# Patient Record
Sex: FEMALE | Race: ASIAN | URBAN - METROPOLITAN AREA
[De-identification: names, ages, dates, MRNs, and addresses within clinical notes are randomized per-mention and may not be internally consistent; named-entity substitution may affect disease eponyms.]

---

## 2022-11-04 ENCOUNTER — HOSPITAL ENCOUNTER (EMERGENCY)
Dept: ULTRASOUND IMAGING | Age: 32
Discharge: HOME | End: 2022-11-07
Payer: MEDICAID

## 2022-11-04 ENCOUNTER — HOSPITAL ENCOUNTER (EMERGENCY)
Age: 32
Discharge: HOME | End: 2022-11-04
Attending: EMERGENCY MEDICINE
Payer: MEDICAID

## 2022-11-04 ENCOUNTER — HOSPITAL ENCOUNTER (EMERGENCY)
Dept: ULTRASOUND IMAGING | Age: 32
End: 2022-11-04
Payer: MEDICAID

## 2022-11-04 VITALS
HEART RATE: 92 BPM | RESPIRATION RATE: 16 BRPM | OXYGEN SATURATION: 98 % | TEMPERATURE: 98 F | BODY MASS INDEX: 23.53 KG/M2 | WEIGHT: 127.87 LBS | DIASTOLIC BLOOD PRESSURE: 58 MMHG | SYSTOLIC BLOOD PRESSURE: 105 MMHG | HEIGHT: 62 IN

## 2022-11-04 DIAGNOSIS — O20.9 BLEEDING IN EARLY PREGNANCY: Primary | ICD-10-CM

## 2022-11-04 DIAGNOSIS — R82.90 ABNORMAL URINE FINDINGS: ICD-10-CM

## 2022-11-04 LAB
ABO + RH BLD: NORMAL
ALBUMIN SERPL-MCNC: 3.5 G/DL (ref 3.5–5)
ALBUMIN/GLOB SERPL: 0.8 {RATIO} (ref 0.4–1.6)
ALP SERPL-CCNC: 69 U/L (ref 50–130)
ALT SERPL-CCNC: 24 U/L (ref 12–65)
ANION GAP SERPL CALC-SCNC: 8 MMOL/L (ref 2–11)
APPEARANCE UR: ABNORMAL
AST SERPL-CCNC: 22 U/L (ref 15–37)
BACTERIA URNS QL MICRO: ABNORMAL /HPF
BASOPHILS # BLD: 0 K/UL (ref 0–0.2)
BASOPHILS NFR BLD: 0 % (ref 0–2)
BILIRUB SERPL-MCNC: 0.5 MG/DL (ref 0.2–1.1)
BILIRUB UR QL: NEGATIVE
BLOOD GROUP ANTIBODIES SERPL: NORMAL
BUN SERPL-MCNC: 5 MG/DL (ref 6–23)
CALCIUM SERPL-MCNC: 9.5 MG/DL (ref 8.3–10.4)
CASTS URNS QL MICRO: ABNORMAL /LPF
CHLORIDE SERPL-SCNC: 104 MMOL/L (ref 101–110)
CO2 SERPL-SCNC: 21 MMOL/L (ref 21–32)
COLOR UR: ABNORMAL
CREAT SERPL-MCNC: 0.42 MG/DL (ref 0.6–1)
CRYSTALS URNS QL MICRO: 0 /LPF
DIFFERENTIAL METHOD BLD: ABNORMAL
EOSINOPHIL # BLD: 0.1 K/UL (ref 0–0.8)
EOSINOPHIL NFR BLD: 2 % (ref 0.5–7.8)
EPI CELLS #/AREA URNS HPF: ABNORMAL /HPF
ERYTHROCYTE [DISTWIDTH] IN BLOOD BY AUTOMATED COUNT: 15.2 % (ref 11.9–14.6)
GLOBULIN SER CALC-MCNC: 4.5 G/DL (ref 2.8–4.5)
GLUCOSE SERPL-MCNC: 83 MG/DL (ref 65–100)
GLUCOSE UR STRIP.AUTO-MCNC: NEGATIVE MG/DL
HCG SERPL-ACNC: ABNORMAL MIU/ML (ref 0–6)
HCT VFR BLD AUTO: 40.5 % (ref 35.8–46.3)
HGB BLD-MCNC: 13.3 G/DL (ref 11.7–15.4)
HGB UR QL STRIP: ABNORMAL
IMM GRANULOCYTES # BLD AUTO: 0 K/UL (ref 0–0.5)
IMM GRANULOCYTES NFR BLD AUTO: 0 % (ref 0–5)
KETONES UR QL STRIP.AUTO: ABNORMAL MG/DL
LEUKOCYTE ESTERASE UR QL STRIP.AUTO: ABNORMAL
LYMPHOCYTES # BLD: 2 K/UL (ref 0.5–4.6)
LYMPHOCYTES NFR BLD: 25 % (ref 13–44)
MCH RBC QN AUTO: 24.7 PG (ref 26.1–32.9)
MCHC RBC AUTO-ENTMCNC: 32.8 G/DL (ref 31.4–35)
MCV RBC AUTO: 75.1 FL (ref 82–102)
MONOCYTES # BLD: 0.5 K/UL (ref 0.1–1.3)
MONOCYTES NFR BLD: 7 % (ref 4–12)
MUCOUS THREADS URNS QL MICRO: 0 /LPF
NEUTS SEG # BLD: 5.1 K/UL (ref 1.7–8.2)
NEUTS SEG NFR BLD: 66 % (ref 43–78)
NITRITE UR QL STRIP.AUTO: NEGATIVE
NRBC # BLD: 0 K/UL (ref 0–0.2)
PH UR STRIP: 7 [PH] (ref 5–9)
PLATELET # BLD AUTO: 288 K/UL (ref 150–450)
PMV BLD AUTO: 10.5 FL (ref 9.4–12.3)
POTASSIUM SERPL-SCNC: 3.8 MMOL/L (ref 3.5–5.1)
PROT SERPL-MCNC: 8 G/DL (ref 6.3–8.2)
PROT UR STRIP-MCNC: NEGATIVE MG/DL
RBC # BLD AUTO: 5.39 M/UL (ref 4.05–5.2)
RBC #/AREA URNS HPF: ABNORMAL /HPF
SODIUM SERPL-SCNC: 133 MMOL/L (ref 133–143)
SP GR UR REFRACTOMETRY: <1.005 (ref 1–1.02)
SPECIMEN EXP DATE BLD: NORMAL
URINE CULTURE IF INDICATED: ABNORMAL
UROBILINOGEN UR QL STRIP.AUTO: 0.2 EU/DL (ref 0.2–1)
WBC # BLD AUTO: 7.8 K/UL (ref 4.3–11.1)
WBC URNS QL MICRO: ABNORMAL /HPF

## 2022-11-04 PROCEDURE — 87086 URINE CULTURE/COLONY COUNT: CPT

## 2022-11-04 PROCEDURE — 86900 BLOOD TYPING SEROLOGIC ABO: CPT

## 2022-11-04 PROCEDURE — 87088 URINE BACTERIA CULTURE: CPT

## 2022-11-04 PROCEDURE — 99284 EMERGENCY DEPT VISIT MOD MDM: CPT

## 2022-11-04 PROCEDURE — 80053 COMPREHEN METABOLIC PANEL: CPT

## 2022-11-04 PROCEDURE — 85025 COMPLETE CBC W/AUTO DIFF WBC: CPT

## 2022-11-04 PROCEDURE — 87186 SC STD MICRODIL/AGAR DIL: CPT

## 2022-11-04 PROCEDURE — 76817 TRANSVAGINAL US OBSTETRIC: CPT

## 2022-11-04 PROCEDURE — 84702 CHORIONIC GONADOTROPIN TEST: CPT

## 2022-11-04 PROCEDURE — 81001 URINALYSIS AUTO W/SCOPE: CPT

## 2022-11-04 PROCEDURE — 76857 US EXAM PELVIC LIMITED: CPT

## 2022-11-04 RX ORDER — CEPHALEXIN 500 MG/1
500 CAPSULE ORAL 3 TIMES DAILY
Qty: 21 CAPSULE | Refills: 0 | Status: SHIPPED | OUTPATIENT
Start: 2022-11-04 | End: 2022-11-04 | Stop reason: SDUPTHER

## 2022-11-04 RX ORDER — CEPHALEXIN 500 MG/1
500 CAPSULE ORAL 3 TIMES DAILY
Qty: 15 CAPSULE | Refills: 0 | Status: SHIPPED | OUTPATIENT
Start: 2022-11-04 | End: 2022-11-09

## 2022-11-04 ASSESSMENT — ENCOUNTER SYMPTOMS
RHINORRHEA: 0
EYE PAIN: 0
COLOR CHANGE: 0
WHEEZING: 0
CONSTIPATION: 0
PHOTOPHOBIA: 0
SINUS PRESSURE: 0
NAUSEA: 0
BLOOD IN STOOL: 0
COUGH: 0
SORE THROAT: 0
BACK PAIN: 1
ABDOMINAL PAIN: 0
SHORTNESS OF BREATH: 0
VOICE CHANGE: 0
APNEA: 0
CHEST TIGHTNESS: 0
DIARRHEA: 0
EYE REDNESS: 0
EYE DISCHARGE: 0
VOMITING: 1

## 2022-11-04 ASSESSMENT — PAIN DESCRIPTION - LOCATION: LOCATION: BACK

## 2022-11-04 ASSESSMENT — PAIN SCALES - GENERAL: PAINLEVEL_OUTOF10: 5

## 2022-11-04 ASSESSMENT — PAIN DESCRIPTION - ORIENTATION: ORIENTATION: LOWER

## 2022-11-04 ASSESSMENT — PAIN - FUNCTIONAL ASSESSMENT: PAIN_FUNCTIONAL_ASSESSMENT: 0-10

## 2022-11-04 NOTE — ED TRIAGE NOTES
Patient advises 5-6 weeks pregnant with second pregnancy and 1 live birth. Patient advise that she started with increased urination last night with mild blood after wiping. Patient with no further complaints at this time.

## 2022-11-04 NOTE — ED PROVIDER NOTES
Emergency Department Provider Note                   PCP:                Shaniqua Gonzalez MD               Age: 34 y.o. Sex: female       ICD-10-CM    1. Bleeding in early pregnancy  O20.9       2. Abnormal urine findings  R82.90           DISPOSITION Decision To Discharge 2022 03:42:23 PM        MDM  Number of Diagnoses or Management Options  Abnormal urine findings  Bleeding in early pregnancy  Diagnosis management comments: 10:41 AM  Ddx includes IUP, ectopic pregnancy, threatened , anemia, premature rupture of membranes,subchorionic hemorrrahge, incomplete , completed , UTI    3:49 PM  Work-up today remarkable for urine showing signs of infection. Ultrasound showed an IUP at approximately 10 weeks of gestational age. Patient is not having any abdominal pain or cramping. Patient is hemodynamically stable. Plan will be to discharge her home with OB/GYN follow-up in 2 days. She reports she does have an OB/GYN that she can go to however I also gave her an OB/GYN number to call in case she cannot establish care. I told her if she is unable to get in with any OB/GYN, that she should return here in 2 days for reassessment. Counseled on warning signs return immediately for including but not limited to fevers, vomiting, chest pain, shortness of breath abdominal cramping, passing large clots. Patient verbalized understanding and agreed with plan. She was discharged in stable condition.          Amount and/or Complexity of Data Reviewed  Clinical lab tests: ordered and reviewed  Tests in the radiology section of CPT®: ordered and reviewed  Independent visualization of images, tracings, or specimens: yes    Risk of Complications, Morbidity, and/or Mortality  Presenting problems: moderate  Diagnostic procedures: moderate  Management options: moderate    Patient Progress  Patient progress: stable       ED Course as of 22 1550      1338 1:39 PM  No rho jessica indicated. [NR]      ED Course User Index  [NR] GREGORY Luna        Orders Placed This Encounter   Procedures    Culture, Urine    US OB 1 OR MORE FETUS LIMITED    CBC with Auto Differential    Comprehensive Metabolic Panel    HCG, Quantitative, Pregnancy    Urinalysis with Reflex to Culture    TYPE AND SCREEN        Medications - No data to display    Current Discharge Medication List        START taking these medications    Details   cephALEXin (KEFLEX) 500 MG capsule Take 1 capsule by mouth 3 times daily for 5 days  Qty: 15 capsule, Refills: 0              Desirae Rosario is a 34 y.o. female who presents to the Emergency Department with chief complaint of    Chief Complaint   Patient presents with    Pregnancy Problem      69-year-old G2, P1 6-week pregnant female presents today complaining of vaginal bleeding that started last night. She reports after urinating she noticed a little bit of blood on the paper after wiping. She reports this morning upon awakening she noticed a little bit more blood in her underwear. She reports no other bleeding since then. She states she has had a few episodes of vomiting yesterday but that has been normal for her since the pregnancy began. She reports mild low back left-sided pain that began 3 days ago that she has also had intermittently since the beginning of the pregnancy. She reports no other new symptoms today. She states her last period was September 26. She reports she does not yet have an OB/GYN but does have an appointment scheduled in 2 weeks to establish care. She denies fevers, profuse bleeding, difficulty breathing, chest pain, abdominal pain. Denies painful urination, urinary frequency. Denies diarrhea, blood in stool, constipation. Denies all other symptoms today. Patient is primary historian and quality is reliable. The history is provided by the patient. No  was used.        Review of Systems   Constitutional: Negative for appetite change, chills, fatigue, fever and unexpected weight change. HENT:  Negative for congestion, ear pain, hearing loss, postnasal drip, rhinorrhea, sinus pressure, sore throat and voice change. Eyes:  Negative for photophobia, pain, discharge, redness and visual disturbance. Respiratory:  Negative for apnea, cough, chest tightness, shortness of breath and wheezing. Cardiovascular:  Negative for chest pain, palpitations and leg swelling. Gastrointestinal:  Positive for vomiting. Negative for abdominal pain, blood in stool, constipation, diarrhea and nausea. Endocrine: Negative for polydipsia, polyphagia and polyuria. Genitourinary:  Positive for vaginal bleeding. Negative for decreased urine volume, dysuria, flank pain, frequency and hematuria. Musculoskeletal:  Positive for back pain. Negative for arthralgias, joint swelling, myalgias and neck stiffness. Skin:  Negative for color change, rash and wound. Neurological:  Negative for dizziness, syncope, speech difficulty, weakness, light-headedness and headaches. Hematological:  Negative for adenopathy. Does not bruise/bleed easily. Psychiatric/Behavioral:  Negative for confusion, self-injury, sleep disturbance and suicidal ideas. All other systems reviewed and are negative. History reviewed. No pertinent past medical history. History reviewed. No pertinent surgical history. History reviewed. No pertinent family history. Social History     Socioeconomic History    Marital status:      Spouse name: None    Number of children: None    Years of education: None    Highest education level: None         Patient has no known allergies. Current Discharge Medication List           Vitals signs and nursing note reviewed. Patient Vitals for the past 4 hrs:   Pulse Resp BP SpO2   11/04/22 1531 92 16 (!) 105/58 98 %          Physical Exam  Vitals and nursing note reviewed.  Exam conducted with a chaperone present. Constitutional:       General: She is not in acute distress. Appearance: Normal appearance. She is normal weight. She is not ill-appearing, toxic-appearing or diaphoretic. Comments: Very well-appearing. Pleasant cooperative. No acute distress. Speaks in full sentences. Normal mentation. HENT:      Head: Normocephalic and atraumatic. Right Ear: Tympanic membrane, ear canal and external ear normal.      Left Ear: Tympanic membrane, ear canal and external ear normal.      Nose: Nose normal.      Mouth/Throat:      Mouth: Mucous membranes are moist.   Eyes:      General: No scleral icterus. Right eye: No discharge. Left eye: No discharge. Conjunctiva/sclera: Conjunctivae normal.   Cardiovascular:      Rate and Rhythm: Normal rate and regular rhythm. Pulses: Normal pulses. Heart sounds: Normal heart sounds. No murmur heard. Pulmonary:      Effort: Pulmonary effort is normal. No respiratory distress. Breath sounds: Normal breath sounds. No stridor. No wheezing, rhonchi or rales. Abdominal:      General: Abdomen is flat. Bowel sounds are normal. There is no distension. Palpations: Abdomen is soft. There is no mass. Tenderness: There is no abdominal tenderness. There is no right CVA tenderness, left CVA tenderness, guarding or rebound. Hernia: No hernia is present. Comments: Benign abdominal exam without focal tenderness. No peritoneal signs. Unable to palpate uterine fundus. No adnexal tenderness or masses. Genitourinary:     Comments: Exam recommended but patient refused  Musculoskeletal:         General: No tenderness. Normal range of motion. Cervical back: Normal range of motion and neck supple. No rigidity or tenderness. Right lower leg: No edema. Left lower leg: No edema. Comments: No lower leg swelling or edema or pain. Lymphadenopathy:      Cervical: No cervical adenopathy.    Skin: General: Skin is warm and dry. Capillary Refill: Capillary refill takes less than 2 seconds. Coloration: Skin is not jaundiced. Neurological:      General: No focal deficit present. Mental Status: She is alert and oriented to person, place, and time. Mental status is at baseline. Psychiatric:         Mood and Affect: Mood normal.         Behavior: Behavior normal.         Thought Content: Thought content normal.         Judgment: Judgment normal.        Procedures    Results for orders placed or performed during the hospital encounter of 11/04/22   US OB 1 OR MORE FETUS LIMITED    Narrative    EXAM: US OB 1 OR MORE FETUS LIMITED  INDICATION: bleeding; 6 week pregnant  COMPARISON: None  TECHNIQUE:  Realtime sonography of the pelvis was performed transabdominally and  transvaginally with multiple static images obtained. FINDINGS:  Uterus is normal in size, measuring 11.3 x 9.5 x 6.7 cm. The cervix is closed. There is a single intrauterine gestational sac with a fetal pole. Crown-rump  length measures 3.88 cm corresponding to a gestational age of 10 weeks 5 days. There is an appropriate amount of amniotic fluid with a yolk sac measuring 0.6  cm. Mean gestational sac diameter measures 5.3 cm. A fetal heart rate is  detected at 165 bpm. There is a small amount of perigestational hemorrhage along  the lower uterine segment. The right ovary measures 3.5 x 3.6 x 2.2 cm. Physiologic follicle. No adnexal  mass. Normal Doppler and spectral flow. The left ovary measures 3.2 x 2.3 x 1.2 cm. No adnexal mass. Normal Doppler and  spectral flow. No free fluid in the pelvis. Impression    1. Single live intrauterine gestation with an estimated gestational age of 6  weeks 5 days. 2. Small volume perigestational hemorrhage with closed cervix.    CBC with Auto Differential   Result Value Ref Range    WBC 7.8 4.3 - 11.1 K/uL    RBC 5.39 (H) 4.05 - 5.2 M/uL    Hemoglobin 13.3 11.7 - 15.4 g/dL Hematocrit 40.5 35.8 - 46.3 %    MCV 75.1 (L) 82.0 - 102.0 FL    MCH 24.7 (L) 26.1 - 32.9 PG    MCHC 32.8 31.4 - 35.0 g/dL    RDW 15.2 (H) 11.9 - 14.6 %    Platelets 390 266 - 070 K/uL    MPV 10.5 9.4 - 12.3 FL    nRBC 0.00 0.0 - 0.2 K/uL    Differential Type AUTOMATED      Seg Neutrophils 66 43 - 78 %    Lymphocytes 25 13 - 44 %    Monocytes 7 4.0 - 12.0 %    Eosinophils % 2 0.5 - 7.8 %    Basophils 0 0.0 - 2.0 %    Immature Granulocytes 0 0.0 - 5.0 %    Segs Absolute 5.1 1.7 - 8.2 K/UL    Absolute Lymph # 2.0 0.5 - 4.6 K/UL    Absolute Mono # 0.5 0.1 - 1.3 K/UL    Absolute Eos # 0.1 0.0 - 0.8 K/UL    Basophils Absolute 0.0 0.0 - 0.2 K/UL    Absolute Immature Granulocyte 0.0 0.0 - 0.5 K/UL   Comprehensive Metabolic Panel   Result Value Ref Range    Sodium 133 133 - 143 mmol/L    Potassium 3.8 3.5 - 5.1 mmol/L    Chloride 104 101 - 110 mmol/L    CO2 21 21 - 32 mmol/L    Anion Gap 8 2 - 11 mmol/L    Glucose 83 65 - 100 mg/dL    BUN 5 (L) 6 - 23 MG/DL    Creatinine 0.42 (L) 0.6 - 1.0 MG/DL    Est, Glom Filt Rate >60 >60 ml/min/1.73m2    Calcium 9.5 8.3 - 10.4 MG/DL    Total Bilirubin 0.5 0.2 - 1.1 MG/DL    ALT 24 12 - 65 U/L    AST 22 15 - 37 U/L    Alk Phosphatase 69 50 - 130 U/L    Total Protein 8.0 6.3 - 8.2 g/dL    Albumin 3.5 3.5 - 5.0 g/dL    Globulin 4.5 2.8 - 4.5 g/dL    Albumin/Globulin Ratio 0.8 0.4 - 1.6     HCG, Quantitative, Pregnancy   Result Value Ref Range    hCG Quant 304,787 (H) 0.0 - 6.0 MIU/ML   Urinalysis with Reflex to Culture    Specimen: Urine   Result Value Ref Range    Color, UA YELLOW/STRAW      Appearance CLOUDY      Specific Gravity, UA <1.005 1.001 - 1.023    pH, Urine 7.0 5.0 - 9.0      Protein, UA Negative NEG mg/dL    Glucose, UA Negative mg/dL    Ketones, Urine TRACE (A) NEG mg/dL    Bilirubin Urine Negative NEG      Blood, Urine MODERATE (A) NEG      Urobilinogen, Urine 0.2 0.2 - 1.0 EU/dL    Nitrite, Urine Negative NEG      Leukocyte Esterase, Urine LARGE (A) NEG      WBC, UA  0 /hpf    RBC, UA 3-5 0 /hpf    BACTERIA, URINE 1+ (H) 0 /hpf    Urine Culture if Indicated URINE CULTURE ORDERED      Epithelial Cells UA 5-10 0 /hpf    Casts 0-3 0 /lpf    Crystals 0 0 /LPF    Mucus, UA 0 0 /lpf   TYPE AND SCREEN   Result Value Ref Range    Crossmatch expiration date 11/07/2022,2359     ABO/Rh B POSITIVE     Antibody Screen NEG         US OB 1 OR MORE FETUS LIMITED   Final Result      1. Single live intrauterine gestation with an estimated gestational age of 5   weeks 5 days. 2. Small volume perigestational hemorrhage with closed cervix. Voice dictation software was used during the making of this note. This software is not perfect and grammatical and other typographical errors may be present. This note has not been completely proofread for errors.      Iona Suarezma  11/04/22 [id number]

## 2022-11-04 NOTE — DISCHARGE INSTRUCTIONS
Your urine today did show signs of bacteria so we will treat that with a course of antibiotics for the next 5 days. Please take this 3 times daily. Your blood work today was unremarkable. Your ultrasound today did show an intrauterine pregnancy at approximately 10 weeks based on size. It is important that you follow-up with an OB/GYN in 2 days for recheck. If you are unable to get in with an OB/GYN, please return here so we may recheck you. Return here for any new or worsening symptoms. Otherwise please follow-up with your OB/GYN. As we discussed, I did not find a life threatening cause of your symptoms today. However, THAT DOES NOT MEAN IT COULD NOT DEVELOP. If you develop ANY new or worsening symptoms, it is critical that you return for re-evaluation. This includes any symptoms that are concerning to you, especially symptoms such as abdominal pain, fevers, vomiting, passing large clots. If you do not return for re-evaluation, you risk serious complications, including death.

## 2022-11-06 LAB
BACTERIA SPEC CULT: ABNORMAL
SERVICE CMNT-IMP: ABNORMAL

## 2022-11-07 NOTE — PROGRESS NOTES
ED pharmacist reviewed recent results of urine culture. The patient received a prescription for cephalexin upon discharge. Based on culture results, the patient requires alternative antimicrobial therapy. Discussed case with Dr. Purnima Chau. A prescription for amoxicillin 500 mg q8h x 5 days has been called into [facility]  on [facility] per Dr. Purnima Chau. The patient has been advised to follow-up with their primary care provider or return to the ED if symptoms do not resolve or worsen.     Allergies as of 11/04/2022    (No Known Allergies)     Vandana Mcgovern PharmD  Pharmacy Resident